# Patient Record
Sex: FEMALE | Race: WHITE
[De-identification: names, ages, dates, MRNs, and addresses within clinical notes are randomized per-mention and may not be internally consistent; named-entity substitution may affect disease eponyms.]

---
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
## 2022-11-22 NOTE — ED EENT
History of Present Illness


General


Chief Complaint:  Oral/Throat Problems


Stated Complaint:  SWOLLEN THROAT


Source:  patient


Exam Limitations:  no limitations





History of Present Illness


Date Seen by Provider:  Sep 29, 2020


Time Seen by Provider:  06:30


Initial Comments


20-year-old female presents with sore throat and Swollen lymph nodes in her 

neck. Patient reports that she was diagnosed yesterday with strep and started on

antibiotic. She comes in today because she's got some swelling of her left 

noticed this worse on the right and is tender. She has pain with swallowing. She

has no fevers or chills. She she has taken one dose of her antibiotic. She is 

not having difficulty breathing. She is not having any stridor or hoarseness. 

She has no other systemic complaints.





Allergies and Home Medications


Allergies


Coded Allergies:  


     Penicillins (Unverified  Allergy, Unknown, 7/6/14)


     Sulfa (Sulfonamide Antibiotics) (Unverified  Allergy, Unknown, 7/6/14)





Home Medications


Cefdinir 300 Mg Capsule, 1 EACH PO BID


   Prescribed by: IVETT MAYFIELD on 7/6/14 9755





Patient Home Medication List


Home Medication List Reviewed:  Yes





Review of Systems


Review of Systems


Constitutional:  No chills, No fever


Eyes:  No Symptoms Reported


Ears:  No Symptoms Reported


Nose:  no symptoms reported


Mouth:  see HPI


Throat:  pain, swelling; denies neck stiffness, denies hoarse; painful 

swallowing


Respiratory:  no symptoms reported


Cardiovascular:  no symptoms reported


Skin:  no symptoms reported


Neurological:  No Symptoms Reported





Past Medical-Social-Family Hx


Past Med/Social Hx:  Reviewed Nursing Past Med/Soc Hx


Patient Social History


Recent Foreign Travel:  No


Contact w/Someone Who Travel:  No





Past Medical History


Reproductive Disorders:  No





Physical Exam


Height, Weight, BMI


Height: 5'2"


Weight: 153lbs. oz. 69.901009mx;  BMI


Method:


General Appearance:  WD/WN, no apparent distress


Eyes:  bilateral eye normal inspection


Nose:  normal inspection


Mouth/Throat:  tonsillar swelling (2+); No uvula swelling, No voice changes


Neck:  full range of motion, supple, lymphadenopathy (R), lymphadenopathy (L)


Cardiovascular:  normal peripheral pulses, regular rate, rhythm, no edema


Respiratory:  lungs clear, normal breath sounds


Gastrointestinal:  non tender, soft


Neurologic/Psychiatric:  alert, normal mood/affect, oriented x 3


Skin:  normal color, warm/dry





Progress/Results/Core Measures


Results/Orders


My Orders





Orders - KRISH GARCÍA DO


Dexamethasone Injection (Decadron Inje (9/29/20 06:30)








Departure


Impression





   Primary Impression:  


   Strep throat


Disposition:  01 HOME, SELF-CARE


Condition:  Stable





Departure-Patient Inst.


Referrals:  


AKOSUA CHRISTY (PCP/Family)


Primary Care Physician


Patient Instructions:  Strep Throat (DC)





Add. Discharge Instructions:  


Take your antibiotics as prescribed


You may use saltwater gargle or other over-the-counter medications to help with 

the sore throat


Tylenol and ibuprofen as needed for fever or pain


Follow-up with her primary care provider as needed or return to the ER if 

symptoms significantly worsen





All discharge instructions reviewed with patient and/or family. Voiced 

understanding.











KRISH GARCÍA DO               Sep 29, 2020 06:30
This patient was evaluated for sepsis.  At this time, a diagnosis of sepsis is not supported by the overall clinical picture.